# Patient Record
Sex: FEMALE | Race: BLACK OR AFRICAN AMERICAN
[De-identification: names, ages, dates, MRNs, and addresses within clinical notes are randomized per-mention and may not be internally consistent; named-entity substitution may affect disease eponyms.]

---

## 2019-08-10 ENCOUNTER — HOSPITAL ENCOUNTER (OUTPATIENT)
Dept: HOSPITAL 92 - ERS | Age: 29
Setting detail: OBSERVATION
LOS: 1 days | Discharge: HOME | End: 2019-08-11
Attending: INTERNAL MEDICINE | Admitting: INTERNAL MEDICINE
Payer: SELF-PAY

## 2019-08-10 VITALS — BODY MASS INDEX: 43.8 KG/M2

## 2019-08-10 DIAGNOSIS — Z91.012: ICD-10-CM

## 2019-08-10 DIAGNOSIS — K85.90: Primary | ICD-10-CM

## 2019-08-10 LAB
ALBUMIN SERPL BCG-MCNC: 3.7 G/DL (ref 3.5–5)
ALP SERPL-CCNC: 45 U/L (ref 40–150)
ALT SERPL W P-5'-P-CCNC: 12 U/L (ref 8–55)
ANION GAP SERPL CALC-SCNC: 10 MMOL/L (ref 10–20)
AST SERPL-CCNC: 13 U/L (ref 5–34)
BASOPHILS # BLD AUTO: 0.1 THOU/UL (ref 0–0.2)
BASOPHILS NFR BLD AUTO: 1.1 % (ref 0–1)
BILIRUB SERPL-MCNC: 0.2 MG/DL (ref 0.2–1.2)
BUN SERPL-MCNC: 14 MG/DL (ref 7–18.7)
CALCIUM SERPL-MCNC: 8.8 MG/DL (ref 7.8–10.44)
CHLORIDE SERPL-SCNC: 109 MMOL/L (ref 98–107)
CO2 SERPL-SCNC: 23 MMOL/L (ref 22–29)
CREAT CL PREDICTED SERPL C-G-VRATE: 0 ML/MIN (ref 70–130)
EOSINOPHIL # BLD AUTO: 0.4 THOU/UL (ref 0–0.7)
EOSINOPHIL NFR BLD AUTO: 7.4 % (ref 0–10)
GLOBULIN SER CALC-MCNC: 2.8 G/DL (ref 2.4–3.5)
GLUCOSE SERPL-MCNC: 92 MG/DL (ref 70–105)
HGB BLD-MCNC: 11.4 G/DL (ref 12–16)
LIPASE SERPL-CCNC: 221 U/L (ref 8–78)
LYMPHOCYTES # BLD: 1.9 THOU/UL (ref 1.2–3.4)
LYMPHOCYTES NFR BLD AUTO: 39.8 % (ref 21–51)
MCH RBC QN AUTO: 28 PG (ref 27–31)
MCV RBC AUTO: 85 FL (ref 78–98)
MONOCYTES # BLD AUTO: 0.5 THOU/UL (ref 0.11–0.59)
MONOCYTES NFR BLD AUTO: 10.5 % (ref 0–10)
NEUTROPHILS # BLD AUTO: 2 THOU/UL (ref 1.4–6.5)
NEUTROPHILS NFR BLD AUTO: 41.2 % (ref 42–75)
PLATELET # BLD AUTO: 272 THOU/UL (ref 130–400)
POTASSIUM SERPL-SCNC: 3.8 MMOL/L (ref 3.5–5.1)
PREGS CONTROL BACKGROUND?: (no result)
PREGS CONTROL BAR APPEAR?: YES
RBC # BLD AUTO: 4.08 MILL/UL (ref 4.2–5.4)
SODIUM SERPL-SCNC: 138 MMOL/L (ref 136–145)
WBC # BLD AUTO: 4.8 THOU/UL (ref 4.8–10.8)

## 2019-08-10 PROCEDURE — 96361 HYDRATE IV INFUSION ADD-ON: CPT

## 2019-08-10 PROCEDURE — 80048 BASIC METABOLIC PNL TOTAL CA: CPT

## 2019-08-10 PROCEDURE — 84703 CHORIONIC GONADOTROPIN ASSAY: CPT

## 2019-08-10 PROCEDURE — 83690 ASSAY OF LIPASE: CPT

## 2019-08-10 PROCEDURE — 96375 TX/PRO/DX INJ NEW DRUG ADDON: CPT

## 2019-08-10 PROCEDURE — G0378 HOSPITAL OBSERVATION PER HR: HCPCS

## 2019-08-10 PROCEDURE — 36415 COLL VENOUS BLD VENIPUNCTURE: CPT

## 2019-08-10 PROCEDURE — 80076 HEPATIC FUNCTION PANEL: CPT

## 2019-08-10 PROCEDURE — 96374 THER/PROPH/DIAG INJ IV PUSH: CPT

## 2019-08-10 PROCEDURE — 80053 COMPREHEN METABOLIC PANEL: CPT

## 2019-08-10 PROCEDURE — 81003 URINALYSIS AUTO W/O SCOPE: CPT

## 2019-08-10 PROCEDURE — 74177 CT ABD & PELVIS W/CONTRAST: CPT

## 2019-08-10 PROCEDURE — 85025 COMPLETE CBC W/AUTO DIFF WBC: CPT

## 2019-08-10 PROCEDURE — 96372 THER/PROPH/DIAG INJ SC/IM: CPT

## 2019-08-10 NOTE — HP
PRIMARY CARE DOCTOR:  Not reported.



CODE STATUS:  Full code.



TIME OF EVALUATION:  7:40 p.m.



CHIEF COMPLAINT:  Abdominal pain.



HISTORY OF PRESENT ILLNESS:  This is a 29 years old female patient, with no

significant past medical history, came to the hospital after having severe, on and

off bilaterally worsening abdominal pain that is diffuse, with no specific

radiation, but worsen in the epigastric area; when on, it is 10/10. 



REVIEW OF SYSTEMS:  CONSTITUTIONAL:  No fever, chills, or generalized weakness. 

RESPIRATORY:  No cough, sputum production, or shortness of breath. 

CARDIOVASCULAR:  No chest pain or palpitation. 

GASTROINTESTINAL:  No nausea, no vomiting, diarrhea.  The patient has severe

abdominal pain as described in HPI. 

CNS:  No dizziness, headache, or feeling lightheaded. 

GENITOURINARY:  No burning on urination. 

EXTREMITIES:  No leg swelling. 



All other systems were reviewed and negative except for the findings mentioned above.



PAST MEDICAL HISTORY:  As mentioned in the HPI.



PAST SURGICAL HISTORY:  The patient has no surgical history.



PSYCH HISTORY:  No psych history.



SOCIAL HISTORY:  The patient drinks alcohol socially, not everyday.  No drugs.  No

smoking history.  Lives at home. 



KNOWN ALLERGIES:  To egg.  No known drug allergies.



REPORTED MEDICATIONS:  None.



PHYSICAL EXAMINATION:

VITAL SIGNS:  On presentation, blood pressure 125/85 with heart rate 68, respiratory

rate was 18, temperature 98.4.  Pain was 7/10.  Oxygen saturation was 99% on room

air. 

GENERAL APPEARANCE:  The patient is alert, oriented, in no acute distress. 

HEENT:  Eyes, normal conjunctivae.  Moist oral mucosa.  Anicteric.  No JVD. 

RESPIRATORY:  Bilateral air entry.  No rales.  No wheezes.  Symmetric expansion. 

CARDIOVASCULAR:  Normal rate, regular rhythm.  No murmurs.  No gallop.  No edema. 

ABDOMEN:  Soft.  The patient has normal bowel sounds.  The patient has diffuse

abdominal tenderness. 

MUSCULOSKELETAL:  Baseline range of motion and strength. 

SKIN:  Warm, intact.  No pallor.  No rash.  No redness.  Capillary refill seems to

be intact. 

NEURO:  No evidence of any new focal weakness.  Cranial nerves seems to be intact. 

PSYCH:  The patient is in good mood.  No anxiety.  Optimal judgment.



DIAGNOSTIC STUDIES:  Abdomen and pelvis CT showed no evidence of acute

intra-abdominal pelvic abnormalities, possible small uterine fibroid. 



LABORATORY DATA:  White count 4.8, hemoglobin 9.4, MCV 85.  Chemistry; sodium 138,

potassium 3.8, chloride 109, carbon dioxide 23, anion gap 10, BUN 14, creatinine

0.77, GFR greater than 90, glucose 92, calcium 8.8, total bilirubin is 0.2.  LFTs

were negative.  Lipase 221.  Pregnancy test was negative.  UA was negative. 



ASSESSMENT AND PLAN:  The patient will be placed in the hospital with following

medical problems; 

1. Acute pancreatitis.  The patient will receive IV fluids and pain medications.

Clear liquid diet.  We will monitor, likely will be able to go home in the morning. 

2. Severe abdominal pain, we will treat accordingly.

3. Deep venous thrombosis prophylaxis.







Job ID:  [id number]

## 2019-08-10 NOTE — CT
CT ABDOMEN AND PELVIS WITH CONTRAST:

 

HISTORY:

Abdominal pain and elevated lipase.

 

COMPARISON:

None.

 

TECHNIQUE:

Multiple contiguous axial images were obtained in a CT of the abdomen and pelvis with contrast.  Tano
nal reformats were performed.

 

FINDINGS:

The liver, gallbladder, kidneys, adrenal glands, spleen, and pancreas are unremarkable.  No free air,
 free fluid, or stranding changes are seen in the abdomen or pelvis.  In particular, no free fluid or
 stranding changes surrounding the pancreas.

 

There is a small amount of high density within the uterus, extending toward the vagina, which may rep
resent a small amount of blood products within the endometrial canal.  There is an exophytic lesion, 
measuring 1.6 cm in size, emanating from the uterus, which may represent a small exophytic uterine fi
broid.  The reproductive organs are otherwise unremarkable.  The large and small bowel are unremarkab
le.  The appendix is unremarkable.  No abdominal or pelvic lymphadenopathy is seen.

 

The osseous structures, the visualized inferior thorax, and the abdominal wall soft tissues are unrem
arkable.

 

IMPRESSION:

1.  No evidence of acute intraabdominal/pelvic abnormality.

 

2.  Possible small uterine fibroid.

 

POS: [facility]

## 2019-08-11 VITALS — SYSTOLIC BLOOD PRESSURE: 152 MMHG | DIASTOLIC BLOOD PRESSURE: 88 MMHG | TEMPERATURE: 97.9 F

## 2019-08-11 LAB
ALBUMIN SERPL BCG-MCNC: 3.1 G/DL (ref 3.5–5)
ALP SERPL-CCNC: 40 U/L (ref 40–150)
ALT SERPL W P-5'-P-CCNC: 9 U/L (ref 8–55)
ANION GAP SERPL CALC-SCNC: 10 MMOL/L (ref 10–20)
AST SERPL-CCNC: 14 U/L (ref 5–34)
BASOPHILS # BLD AUTO: 0 THOU/UL (ref 0–0.2)
BASOPHILS NFR BLD AUTO: 0.7 % (ref 0–1)
BILIRUB DIRECT SERPL-MCNC: 0.2 MG/DL (ref 0.1–0.3)
BILIRUB SERPL-MCNC: 0.3 MG/DL (ref 0.2–1.2)
BUN SERPL-MCNC: 11 MG/DL (ref 7–18.7)
CALCIUM SERPL-MCNC: 7.9 MG/DL (ref 7.8–10.44)
CHLORIDE SERPL-SCNC: 111 MMOL/L (ref 98–107)
CO2 SERPL-SCNC: 21 MMOL/L (ref 22–29)
CREAT CL PREDICTED SERPL C-G-VRATE: 196 ML/MIN (ref 70–130)
EOSINOPHIL # BLD AUTO: 0.4 THOU/UL (ref 0–0.7)
EOSINOPHIL NFR BLD AUTO: 6.5 % (ref 0–10)
GLUCOSE SERPL-MCNC: 86 MG/DL (ref 70–105)
HGB BLD-MCNC: 10.3 G/DL (ref 12–16)
LIPASE SERPL-CCNC: 38 U/L (ref 8–78)
LYMPHOCYTES # BLD: 2.7 THOU/UL (ref 1.2–3.4)
LYMPHOCYTES NFR BLD AUTO: 47.2 % (ref 21–51)
MCH RBC QN AUTO: 28 PG (ref 27–31)
MCV RBC AUTO: 86.5 FL (ref 78–98)
MONOCYTES # BLD AUTO: 0.4 THOU/UL (ref 0.11–0.59)
MONOCYTES NFR BLD AUTO: 7.5 % (ref 0–10)
NEUTROPHILS # BLD AUTO: 2.2 THOU/UL (ref 1.4–6.5)
NEUTROPHILS NFR BLD AUTO: 38.2 % (ref 42–75)
PLATELET # BLD AUTO: 234 THOU/UL (ref 130–400)
POTASSIUM SERPL-SCNC: 3.5 MMOL/L (ref 3.5–5.1)
RBC # BLD AUTO: 3.68 MILL/UL (ref 4.2–5.4)
SODIUM SERPL-SCNC: 138 MMOL/L (ref 136–145)
WBC # BLD AUTO: 5.7 THOU/UL (ref 4.8–10.8)

## 2019-09-03 ENCOUNTER — HOSPITAL ENCOUNTER (EMERGENCY)
Dept: HOSPITAL 92 - ERS | Age: 29
Discharge: HOME | End: 2019-09-03
Payer: SELF-PAY

## 2019-09-03 DIAGNOSIS — R11.0: ICD-10-CM

## 2019-09-03 DIAGNOSIS — F17.210: ICD-10-CM

## 2019-09-03 DIAGNOSIS — R10.9: Primary | ICD-10-CM

## 2019-09-03 LAB
ALBUMIN SERPL BCG-MCNC: 3.6 G/DL (ref 3.5–5)
ALP SERPL-CCNC: 47 U/L (ref 40–150)
ALT SERPL W P-5'-P-CCNC: 14 U/L (ref 8–55)
ANION GAP SERPL CALC-SCNC: 10 MMOL/L (ref 10–20)
AST SERPL-CCNC: 15 U/L (ref 5–34)
BASOPHILS # BLD AUTO: 0 THOU/UL (ref 0–0.2)
BASOPHILS NFR BLD AUTO: 0.3 % (ref 0–1)
BILIRUB SERPL-MCNC: 0.3 MG/DL (ref 0.2–1.2)
BUN SERPL-MCNC: 13 MG/DL (ref 7–18.7)
CALCIUM SERPL-MCNC: 8.4 MG/DL (ref 7.8–10.44)
CHLORIDE SERPL-SCNC: 108 MMOL/L (ref 98–107)
CO2 SERPL-SCNC: 23 MMOL/L (ref 22–29)
CREAT CL PREDICTED SERPL C-G-VRATE: 0 ML/MIN (ref 70–130)
EOSINOPHIL # BLD AUTO: 0.4 THOU/UL (ref 0–0.7)
EOSINOPHIL NFR BLD AUTO: 7.4 % (ref 0–10)
GLOBULIN SER CALC-MCNC: 2.5 G/DL (ref 2.4–3.5)
GLUCOSE SERPL-MCNC: 90 MG/DL (ref 70–105)
HGB BLD-MCNC: 11.1 G/DL (ref 12–16)
LIPASE SERPL-CCNC: 63 U/L (ref 8–78)
LYMPHOCYTES # BLD: 1.3 THOU/UL (ref 1.2–3.4)
LYMPHOCYTES NFR BLD AUTO: 22.9 % (ref 21–51)
MCH RBC QN AUTO: 28.3 PG (ref 27–31)
MCV RBC AUTO: 84.9 FL (ref 78–98)
MONOCYTES # BLD AUTO: 0.6 THOU/UL (ref 0.11–0.59)
MONOCYTES NFR BLD AUTO: 11 % (ref 0–10)
NEUTROPHILS # BLD AUTO: 3.3 THOU/UL (ref 1.4–6.5)
NEUTROPHILS NFR BLD AUTO: 58.4 % (ref 42–75)
PLATELET # BLD AUTO: 299 THOU/UL (ref 130–400)
POTASSIUM SERPL-SCNC: 3.7 MMOL/L (ref 3.5–5.1)
PREGU CONTROL BACKGROUND?: (no result)
PREGU CONTROL BAR APPEAR?: YES
PROT UR STRIP.AUTO-MCNC: 30 MG/DL
RBC # BLD AUTO: 3.92 MILL/UL (ref 4.2–5.4)
SODIUM SERPL-SCNC: 137 MMOL/L (ref 136–145)
WBC # BLD AUTO: 5.7 THOU/UL (ref 4.8–10.8)
WBC UR QL AUTO: (no result) HPF (ref 0–3)

## 2019-09-03 PROCEDURE — 36415 COLL VENOUS BLD VENIPUNCTURE: CPT

## 2019-09-03 PROCEDURE — 81015 MICROSCOPIC EXAM OF URINE: CPT

## 2019-09-03 PROCEDURE — 85025 COMPLETE CBC W/AUTO DIFF WBC: CPT

## 2019-09-03 PROCEDURE — 81025 URINE PREGNANCY TEST: CPT

## 2019-09-03 PROCEDURE — 81003 URINALYSIS AUTO W/O SCOPE: CPT

## 2019-09-03 PROCEDURE — 80053 COMPREHEN METABOLIC PANEL: CPT

## 2019-09-03 PROCEDURE — 99284 EMERGENCY DEPT VISIT MOD MDM: CPT

## 2019-09-03 PROCEDURE — 96372 THER/PROPH/DIAG INJ SC/IM: CPT

## 2019-09-03 PROCEDURE — 83690 ASSAY OF LIPASE: CPT

## 2019-10-04 ENCOUNTER — HOSPITAL ENCOUNTER (EMERGENCY)
Dept: HOSPITAL 92 - ERS | Age: 29
Discharge: HOME | End: 2019-10-04
Payer: SELF-PAY

## 2019-10-04 DIAGNOSIS — K59.00: Primary | ICD-10-CM

## 2019-10-04 DIAGNOSIS — F17.210: ICD-10-CM

## 2019-10-04 LAB
ALBUMIN SERPL BCG-MCNC: 3.7 G/DL (ref 3.5–5)
ALP SERPL-CCNC: 44 U/L (ref 40–110)
ALT SERPL W P-5'-P-CCNC: 11 U/L (ref 8–55)
ANION GAP SERPL CALC-SCNC: 10 MMOL/L (ref 10–20)
AST SERPL-CCNC: 12 U/L (ref 5–34)
BASOPHILS # BLD AUTO: 0 THOU/UL (ref 0–0.2)
BASOPHILS NFR BLD AUTO: 0.5 % (ref 0–1)
BILIRUB SERPL-MCNC: 0.2 MG/DL (ref 0.2–1.2)
BUN SERPL-MCNC: 15 MG/DL (ref 7–18.7)
CALCIUM SERPL-MCNC: 8.4 MG/DL (ref 7.8–10.44)
CHLORIDE SERPL-SCNC: 107 MMOL/L (ref 98–107)
CO2 SERPL-SCNC: 24 MMOL/L (ref 22–29)
CREAT CL PREDICTED SERPL C-G-VRATE: 0 ML/MIN (ref 70–130)
EOSINOPHIL # BLD AUTO: 0.2 THOU/UL (ref 0–0.7)
EOSINOPHIL NFR BLD AUTO: 3.4 % (ref 0–10)
GLOBULIN SER CALC-MCNC: 2.8 G/DL (ref 2.4–3.5)
GLUCOSE SERPL-MCNC: 101 MG/DL (ref 70–105)
HGB BLD-MCNC: 11.8 G/DL (ref 12–16)
LIPASE SERPL-CCNC: 18 U/L (ref 8–78)
LYMPHOCYTES # BLD: 1.7 THOU/UL (ref 1.2–3.4)
LYMPHOCYTES NFR BLD AUTO: 27.4 % (ref 21–51)
MCH RBC QN AUTO: 27.9 PG (ref 27–31)
MCV RBC AUTO: 85.8 FL (ref 78–98)
MONOCYTES # BLD AUTO: 0.6 THOU/UL (ref 0.11–0.59)
MONOCYTES NFR BLD AUTO: 9.2 % (ref 0–10)
NEUTROPHILS # BLD AUTO: 3.6 THOU/UL (ref 1.4–6.5)
NEUTROPHILS NFR BLD AUTO: 59.6 % (ref 42–75)
PLATELET # BLD AUTO: 295 THOU/UL (ref 130–400)
POTASSIUM SERPL-SCNC: 3.5 MMOL/L (ref 3.5–5.1)
PREGS CONTROL BACKGROUND?: (no result)
PREGS CONTROL BAR APPEAR?: YES
RBC # BLD AUTO: 4.22 MILL/UL (ref 4.2–5.4)
SODIUM SERPL-SCNC: 137 MMOL/L (ref 136–145)
WBC # BLD AUTO: 6 THOU/UL (ref 4.8–10.8)

## 2019-10-04 PROCEDURE — 85025 COMPLETE CBC W/AUTO DIFF WBC: CPT

## 2019-10-04 PROCEDURE — 83690 ASSAY OF LIPASE: CPT

## 2019-10-04 PROCEDURE — 74022 RADEX COMPL AQT ABD SERIES: CPT

## 2019-10-04 PROCEDURE — 84703 CHORIONIC GONADOTROPIN ASSAY: CPT

## 2019-10-04 PROCEDURE — 36415 COLL VENOUS BLD VENIPUNCTURE: CPT

## 2019-10-04 PROCEDURE — 80053 COMPREHEN METABOLIC PANEL: CPT

## 2019-10-04 NOTE — RAD
2 VIEWS OF ABDOMEN AND UPRIGHT VIEW OF CHEST:

 

Date:  10/04/19 

 

HISTORY:  

Abdominal pain that began 1 day ago. 

 

FINDINGS:

Supine and upright views of the abdomen and upright view of the chest shows nonspecific, nonobstructi
ve bowel gas pattern. No free air or air fluid levels are seen on upright examination. 

 

The heart is normal in size. There is no evidence of consolidation, mass, or pleural effusion. 

 

IMPRESSION: 

Nonobstructive bowel gas pattern. 

 

 

POS: [facility]

## 2020-01-03 ENCOUNTER — HOSPITAL ENCOUNTER (EMERGENCY)
Dept: HOSPITAL 92 - ERS | Age: 30
Discharge: HOME | End: 2020-01-03
Payer: SELF-PAY

## 2020-01-03 DIAGNOSIS — F17.210: ICD-10-CM

## 2020-01-03 DIAGNOSIS — R10.30: Primary | ICD-10-CM

## 2020-01-03 LAB
BASOPHILS # BLD AUTO: 0 THOU/UL (ref 0–0.2)
BASOPHILS NFR BLD AUTO: 0.4 % (ref 0–1)
EOSINOPHIL # BLD AUTO: 0.3 THOU/UL (ref 0–0.7)
EOSINOPHIL NFR BLD AUTO: 6.8 % (ref 0–10)
HGB BLD-MCNC: 11.7 G/DL (ref 12–16)
LEUKOCYTE ESTERASE UR QL STRIP.AUTO: 250 LEU/UL
LYMPHOCYTES # BLD: 1.8 THOU/UL (ref 1.2–3.4)
LYMPHOCYTES NFR BLD AUTO: 37.9 % (ref 21–51)
MCH RBC QN AUTO: 26.8 PG (ref 27–31)
MCV RBC AUTO: 81.9 FL (ref 78–98)
MONOCYTES # BLD AUTO: 0.4 THOU/UL (ref 0.11–0.59)
MONOCYTES NFR BLD AUTO: 9.3 % (ref 0–10)
NEUTROPHILS # BLD AUTO: 2.1 THOU/UL (ref 1.4–6.5)
NEUTROPHILS NFR BLD AUTO: 45.7 % (ref 42–75)
PLATELET # BLD AUTO: 335 THOU/UL (ref 130–400)
PREGS CONTROL BACKGROUND?: (no result)
PREGS CONTROL BAR APPEAR?: YES
PROT UR STRIP.AUTO-MCNC: 20 MG/DL
RBC # BLD AUTO: 4.35 MILL/UL (ref 4.2–5.4)
WBC # BLD AUTO: 4.6 THOU/UL (ref 4.8–10.8)

## 2020-01-03 PROCEDURE — 81015 MICROSCOPIC EXAM OF URINE: CPT

## 2020-01-03 PROCEDURE — 96372 THER/PROPH/DIAG INJ SC/IM: CPT

## 2020-01-03 PROCEDURE — 84703 CHORIONIC GONADOTROPIN ASSAY: CPT

## 2020-01-03 PROCEDURE — 99284 EMERGENCY DEPT VISIT MOD MDM: CPT

## 2020-01-03 PROCEDURE — 36415 COLL VENOUS BLD VENIPUNCTURE: CPT

## 2020-01-03 PROCEDURE — 81003 URINALYSIS AUTO W/O SCOPE: CPT

## 2020-01-03 PROCEDURE — 85025 COMPLETE CBC W/AUTO DIFF WBC: CPT

## 2020-02-03 ENCOUNTER — HOSPITAL ENCOUNTER (EMERGENCY)
Dept: HOSPITAL 92 - ERS | Age: 30
Discharge: HOME | End: 2020-02-03
Payer: SELF-PAY

## 2020-02-03 DIAGNOSIS — N94.6: Primary | ICD-10-CM

## 2020-02-03 DIAGNOSIS — F17.210: ICD-10-CM

## 2020-02-03 DIAGNOSIS — R11.2: ICD-10-CM

## 2020-02-03 LAB
ALBUMIN SERPL BCG-MCNC: 3.6 G/DL (ref 3.5–5)
ALP SERPL-CCNC: 44 U/L (ref 40–110)
ALT SERPL W P-5'-P-CCNC: 10 U/L (ref 8–55)
ANION GAP SERPL CALC-SCNC: 10 MMOL/L (ref 10–20)
AST SERPL-CCNC: 12 U/L (ref 5–34)
BASOPHILS # BLD AUTO: 0.1 THOU/UL (ref 0–0.2)
BASOPHILS NFR BLD AUTO: 1.1 % (ref 0–1)
BILIRUB SERPL-MCNC: 0.2 MG/DL (ref 0.2–1.2)
BUN SERPL-MCNC: 12 MG/DL (ref 7–18.7)
CALCIUM SERPL-MCNC: 8.5 MG/DL (ref 7.8–10.44)
CHLORIDE SERPL-SCNC: 106 MMOL/L (ref 98–107)
CO2 SERPL-SCNC: 24 MMOL/L (ref 22–29)
CREAT CL PREDICTED SERPL C-G-VRATE: 0 ML/MIN (ref 70–130)
EOSINOPHIL # BLD AUTO: 0.2 THOU/UL (ref 0–0.7)
EOSINOPHIL NFR BLD AUTO: 3.5 % (ref 0–10)
GLOBULIN SER CALC-MCNC: 2.7 G/DL (ref 2.4–3.5)
GLUCOSE SERPL-MCNC: 117 MG/DL (ref 70–105)
HGB BLD-MCNC: 13.3 G/DL (ref 12–16)
LYMPHOCYTES # BLD: 1.8 THOU/UL (ref 1.2–3.4)
LYMPHOCYTES NFR BLD AUTO: 29.1 % (ref 21–51)
MCH RBC QN AUTO: 26.6 PG (ref 27–31)
MCV RBC AUTO: 83.6 FL (ref 78–98)
MONOCYTES # BLD AUTO: 0.6 THOU/UL (ref 0.11–0.59)
MONOCYTES NFR BLD AUTO: 9.4 % (ref 0–10)
NEUTROPHILS # BLD AUTO: 3.6 THOU/UL (ref 1.4–6.5)
NEUTROPHILS NFR BLD AUTO: 56.9 % (ref 42–75)
PLATELET # BLD AUTO: 287 THOU/UL (ref 130–400)
POTASSIUM SERPL-SCNC: 3.5 MMOL/L (ref 3.5–5.1)
PREGS CONTROL BACKGROUND?: (no result)
PREGS CONTROL BAR APPEAR?: YES
PROT UR STRIP.AUTO-MCNC: 70 MG/DL
RBC # BLD AUTO: 5.02 MILL/UL (ref 4.2–5.4)
RBC UR QL AUTO: (no result) HPF (ref 0–3)
SODIUM SERPL-SCNC: 136 MMOL/L (ref 136–145)
WBC # BLD AUTO: 6.3 THOU/UL (ref 4.8–10.8)
WBC UR QL AUTO: (no result) HPF (ref 0–3)

## 2020-02-03 PROCEDURE — 93976 VASCULAR STUDY: CPT

## 2020-02-03 PROCEDURE — 81003 URINALYSIS AUTO W/O SCOPE: CPT

## 2020-02-03 PROCEDURE — 96361 HYDRATE IV INFUSION ADD-ON: CPT

## 2020-02-03 PROCEDURE — 81015 MICROSCOPIC EXAM OF URINE: CPT

## 2020-02-03 PROCEDURE — 76856 US EXAM PELVIC COMPLETE: CPT

## 2020-02-03 PROCEDURE — 84703 CHORIONIC GONADOTROPIN ASSAY: CPT

## 2020-02-03 PROCEDURE — 93005 ELECTROCARDIOGRAM TRACING: CPT

## 2020-02-03 PROCEDURE — 36415 COLL VENOUS BLD VENIPUNCTURE: CPT

## 2020-02-03 PROCEDURE — 85025 COMPLETE CBC W/AUTO DIFF WBC: CPT

## 2020-02-03 PROCEDURE — 96375 TX/PRO/DX INJ NEW DRUG ADDON: CPT

## 2020-02-03 PROCEDURE — 96374 THER/PROPH/DIAG INJ IV PUSH: CPT

## 2020-02-03 PROCEDURE — 80053 COMPREHEN METABOLIC PANEL: CPT

## 2020-02-03 NOTE — ULT
TRANSABDOMINAL PELVIC ULTRASOUND:

 

INDICATION: 

Pelvic pain.

 

TECHNIQUE: 

Gray scale, color Doppler, and spectral Doppler images were obtained of the pelvis via transabdominal
 approach.  The patient declined a transvaginal examination. 

 

FINDINGS: 

There is limited detail of the uterus due to overlying bowel gas and poor acoustic window of the lowe
r pelvis through the bladder.  The bladder is not fully distended.  The uterus measures approximately
 4.2 x 4.9 x 7.2 cm.  The endometrial stripe is not well seen.  The right ovary measures 2.2 x 1.2 x 
2.1 cm.  The left ovary measures 1.3 x 2.6 x 1.9 cm.  There is normal flow to the right and left ovar
y.  No free fluid is evident.

 

IMPRESSION: 

1.  Technically limited examination.

 

2.  No definite acute sonographic abnormality evident within limitation of the exam.

 

3.  No free fluid.

 

POS: [facility]

## 2021-03-18 ENCOUNTER — HOSPITAL ENCOUNTER (EMERGENCY)
Dept: HOSPITAL 92 - ERS | Age: 31
Discharge: HOME | End: 2021-03-18
Payer: SELF-PAY

## 2021-03-18 DIAGNOSIS — F17.210: ICD-10-CM

## 2021-03-18 DIAGNOSIS — N93.9: Primary | ICD-10-CM

## 2021-03-18 DIAGNOSIS — D64.9: ICD-10-CM

## 2021-03-18 DIAGNOSIS — M72.2: ICD-10-CM

## 2021-03-18 LAB
BASOPHILS # BLD AUTO: 0.1 THOU/UL (ref 0–0.2)
BASOPHILS NFR BLD AUTO: 0.8 % (ref 0–1)
EOSINOPHIL # BLD AUTO: 0.2 THOU/UL (ref 0–0.7)
EOSINOPHIL NFR BLD AUTO: 3.1 % (ref 0–10)
HGB BLD-MCNC: 10.9 G/DL (ref 12–16)
LYMPHOCYTES # BLD: 2.7 THOU/UL (ref 1.2–3.4)
LYMPHOCYTES NFR BLD AUTO: 35.9 % (ref 21–51)
MCH RBC QN AUTO: 27.4 PG (ref 27–31)
MCV RBC AUTO: 82.8 FL (ref 78–98)
MONOCYTES # BLD AUTO: 0.5 THOU/UL (ref 0.11–0.59)
MONOCYTES NFR BLD AUTO: 7 % (ref 0–10)
NEUTROPHILS # BLD AUTO: 4 THOU/UL (ref 1.4–6.5)
NEUTROPHILS NFR BLD AUTO: 53.2 % (ref 42–75)
PLATELET # BLD AUTO: 306 THOU/UL (ref 130–400)
PREGS CONTROL BACKGROUND?: (no result)
PREGS CONTROL BAR APPEAR?: YES
RBC # BLD AUTO: 3.97 MILL/UL (ref 4.2–5.4)
WBC # BLD AUTO: 7.5 THOU/UL (ref 4.8–10.8)

## 2021-03-18 PROCEDURE — 36415 COLL VENOUS BLD VENIPUNCTURE: CPT

## 2021-03-18 PROCEDURE — 84703 CHORIONIC GONADOTROPIN ASSAY: CPT

## 2021-03-18 PROCEDURE — 85025 COMPLETE CBC W/AUTO DIFF WBC: CPT

## 2021-03-18 PROCEDURE — 99281 EMR DPT VST MAYX REQ PHY/QHP: CPT

## 2021-04-21 ENCOUNTER — HOSPITAL ENCOUNTER (EMERGENCY)
Dept: HOSPITAL 92 - ERS | Age: 31
Discharge: HOME | End: 2021-04-21
Payer: SELF-PAY

## 2021-04-21 DIAGNOSIS — R10.30: Primary | ICD-10-CM

## 2021-04-21 DIAGNOSIS — R42: ICD-10-CM

## 2021-04-21 DIAGNOSIS — N93.9: ICD-10-CM

## 2021-04-21 DIAGNOSIS — R11.2: ICD-10-CM

## 2021-04-21 LAB
ALBUMIN SERPL BCG-MCNC: 3.6 G/DL (ref 3.5–5)
ALP SERPL-CCNC: 53 U/L (ref 40–110)
ALT SERPL W P-5'-P-CCNC: 11 U/L (ref 8–55)
ANION GAP SERPL CALC-SCNC: 13 MMOL/L (ref 10–20)
AST SERPL-CCNC: 12 U/L (ref 5–34)
BACTERIA UR QL AUTO: (no result) HPF
BASOPHILS # BLD AUTO: 0 THOU/UL (ref 0–0.2)
BASOPHILS NFR BLD AUTO: 0.3 % (ref 0–1)
BILIRUB SERPL-MCNC: 0.2 MG/DL (ref 0.2–1.2)
BUN SERPL-MCNC: 13 MG/DL (ref 7–18.7)
CALCIUM SERPL-MCNC: 8.9 MG/DL (ref 7.8–10.44)
CHLORIDE SERPL-SCNC: 108 MMOL/L (ref 98–107)
CO2 SERPL-SCNC: 24 MMOL/L (ref 22–29)
CREAT CL PREDICTED SERPL C-G-VRATE: 0 ML/MIN (ref 70–130)
EOSINOPHIL # BLD AUTO: 0.1 THOU/UL (ref 0–0.7)
EOSINOPHIL NFR BLD AUTO: 0.9 % (ref 0–10)
GLOBULIN SER CALC-MCNC: 3 G/DL (ref 2.4–3.5)
GLUCOSE SERPL-MCNC: 100 MG/DL (ref 70–105)
HGB BLD-MCNC: 11.3 G/DL (ref 12–16)
LIPASE SERPL-CCNC: 15 U/L (ref 8–78)
LYMPHOCYTES # BLD: 1 THOU/UL (ref 1.2–3.4)
LYMPHOCYTES NFR BLD AUTO: 15 % (ref 21–51)
MCH RBC QN AUTO: 27.1 PG (ref 27–31)
MCV RBC AUTO: 83.9 FL (ref 78–98)
MONOCYTES # BLD AUTO: 0.6 THOU/UL (ref 0.11–0.59)
MONOCYTES NFR BLD AUTO: 8.7 % (ref 0–10)
NEUTROPHILS # BLD AUTO: 4.9 THOU/UL (ref 1.4–6.5)
NEUTROPHILS NFR BLD AUTO: 75.1 % (ref 42–75)
PLATELET # BLD AUTO: 302 THOU/UL (ref 130–400)
POTASSIUM SERPL-SCNC: 3.8 MMOL/L (ref 3.5–5.1)
PREGU CONTROL BACKGROUND?: (no result)
PREGU CONTROL BAR APPEAR?: YES
RBC # BLD AUTO: 4.18 MILL/UL (ref 4.2–5.4)
SODIUM SERPL-SCNC: 141 MMOL/L (ref 136–145)
SP GR UR STRIP: 1.02 (ref 1–1.04)
WBC # BLD AUTO: 6.6 THOU/UL (ref 4.8–10.8)
WBC UR QL AUTO: (no result) HPF (ref 0–3)

## 2021-04-21 PROCEDURE — 81025 URINE PREGNANCY TEST: CPT

## 2021-04-21 PROCEDURE — 80053 COMPREHEN METABOLIC PANEL: CPT

## 2021-04-21 PROCEDURE — 96372 THER/PROPH/DIAG INJ SC/IM: CPT

## 2021-04-21 PROCEDURE — 83690 ASSAY OF LIPASE: CPT

## 2021-04-21 PROCEDURE — 85025 COMPLETE CBC W/AUTO DIFF WBC: CPT

## 2021-04-21 PROCEDURE — 81001 URINALYSIS AUTO W/SCOPE: CPT

## 2021-04-21 PROCEDURE — 99284 EMERGENCY DEPT VISIT MOD MDM: CPT

## 2021-04-21 PROCEDURE — 87086 URINE CULTURE/COLONY COUNT: CPT

## 2021-05-04 ENCOUNTER — HOSPITAL ENCOUNTER (EMERGENCY)
Dept: HOSPITAL 92 - ERS | Age: 31
Discharge: HOME | End: 2021-05-04
Payer: SELF-PAY

## 2021-05-04 DIAGNOSIS — M72.2: Primary | ICD-10-CM

## 2021-05-04 PROCEDURE — 99281 EMR DPT VST MAYX REQ PHY/QHP: CPT

## 2021-05-22 ENCOUNTER — HOSPITAL ENCOUNTER (EMERGENCY)
Dept: HOSPITAL 92 - CSHERS | Age: 31
Discharge: HOME | End: 2021-05-22
Payer: SELF-PAY

## 2021-05-22 DIAGNOSIS — R10.30: Primary | ICD-10-CM

## 2021-05-22 LAB
PREGS CONTROL BACKGROUND?: (no result)
PREGS CONTROL BAR APPEAR?: YES

## 2021-05-22 PROCEDURE — 96372 THER/PROPH/DIAG INJ SC/IM: CPT

## 2021-05-22 PROCEDURE — 84703 CHORIONIC GONADOTROPIN ASSAY: CPT

## 2021-05-22 PROCEDURE — 36415 COLL VENOUS BLD VENIPUNCTURE: CPT

## 2021-05-22 PROCEDURE — 99284 EMERGENCY DEPT VISIT MOD MDM: CPT

## 2021-05-31 ENCOUNTER — HOSPITAL ENCOUNTER (EMERGENCY)
Dept: HOSPITAL 92 - ERS | Age: 31
Discharge: HOME | End: 2021-05-31
Payer: SELF-PAY

## 2021-05-31 DIAGNOSIS — J02.9: Primary | ICD-10-CM

## 2021-05-31 DIAGNOSIS — J34.89: ICD-10-CM

## 2021-05-31 PROCEDURE — 99283 EMERGENCY DEPT VISIT LOW MDM: CPT

## 2021-05-31 PROCEDURE — 87081 CULTURE SCREEN ONLY: CPT

## 2021-05-31 PROCEDURE — 87430 STREP A AG IA: CPT

## 2021-07-05 ENCOUNTER — HOSPITAL ENCOUNTER (EMERGENCY)
Dept: HOSPITAL 18 - NAV ERS | Age: 31
LOS: 1 days | Discharge: HOME | End: 2021-07-06
Payer: COMMERCIAL

## 2021-07-05 DIAGNOSIS — S39.012A: Primary | ICD-10-CM
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
## 2021-07-05 DIAGNOSIS — R51.9: ICD-10-CM
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
## 2021-07-05 LAB
PREGU CONTROL BACKGROUND?: (no result)
PREGU CONTROL BAR APPEAR?: YES

## 2021-07-05 PROCEDURE — 72100 X-RAY EXAM L-S SPINE 2/3 VWS: CPT

## 2021-07-05 PROCEDURE — 70450 CT HEAD/BRAIN W/O DYE: CPT

## 2021-07-05 PROCEDURE — 81025 URINE PREGNANCY TEST: CPT

## 2021-12-03 ENCOUNTER — HOSPITAL ENCOUNTER (EMERGENCY)
Dept: HOSPITAL 18 - NAV ERS | Age: 31
Discharge: HOME | End: 2021-12-03
Payer: SELF-PAY

## 2021-12-03 DIAGNOSIS — R19.7: ICD-10-CM

## 2021-12-03 DIAGNOSIS — K29.70: Primary | ICD-10-CM

## 2021-12-03 PROCEDURE — 99283 EMERGENCY DEPT VISIT LOW MDM: CPT

## 2021-12-12 ENCOUNTER — HOSPITAL ENCOUNTER (EMERGENCY)
Dept: HOSPITAL 92 - ERS | Age: 31
Discharge: HOME | End: 2021-12-12
Payer: SELF-PAY

## 2021-12-12 DIAGNOSIS — J36: Primary | ICD-10-CM

## 2021-12-12 PROCEDURE — 96375 TX/PRO/DX INJ NEW DRUG ADDON: CPT

## 2021-12-12 PROCEDURE — 96365 THER/PROPH/DIAG IV INF INIT: CPT

## 2022-02-02 ENCOUNTER — HOSPITAL ENCOUNTER (EMERGENCY)
Dept: HOSPITAL 92 - ERS | Age: 32
Discharge: HOME | End: 2022-02-02
Payer: SELF-PAY

## 2022-02-02 DIAGNOSIS — N94.6: Primary | ICD-10-CM

## 2022-02-02 DIAGNOSIS — E66.01: ICD-10-CM

## 2022-02-02 DIAGNOSIS — R10.30: ICD-10-CM

## 2022-02-02 DIAGNOSIS — F17.210: ICD-10-CM

## 2022-02-02 PROCEDURE — 99283 EMERGENCY DEPT VISIT LOW MDM: CPT

## 2022-02-15 ENCOUNTER — HOSPITAL ENCOUNTER (EMERGENCY)
Dept: HOSPITAL 18 - NAV ERS | Age: 32
Discharge: HOME | End: 2022-02-15
Payer: SELF-PAY

## 2022-02-15 DIAGNOSIS — F17.210: ICD-10-CM

## 2022-02-15 DIAGNOSIS — R11.2: Primary | ICD-10-CM

## 2022-02-15 DIAGNOSIS — R10.10: ICD-10-CM

## 2022-02-15 PROCEDURE — 96372 THER/PROPH/DIAG INJ SC/IM: CPT

## 2022-02-15 PROCEDURE — 99283 EMERGENCY DEPT VISIT LOW MDM: CPT

## 2022-02-16 ENCOUNTER — HOSPITAL ENCOUNTER (EMERGENCY)
Dept: HOSPITAL 18 - NAV ERS | Age: 32
Discharge: HOME | End: 2022-02-16
Payer: SELF-PAY

## 2022-02-16 ENCOUNTER — HOSPITAL ENCOUNTER (EMERGENCY)
Dept: HOSPITAL 92 - CSHERS | Age: 32
Discharge: HOME | End: 2022-02-16
Payer: SELF-PAY

## 2022-02-16 DIAGNOSIS — A08.4: Primary | ICD-10-CM

## 2022-02-16 DIAGNOSIS — F17.210: ICD-10-CM

## 2022-02-16 LAB
ALBUMIN SERPL BCG-MCNC: 4.1 G/DL (ref 3.5–5)
ALP SERPL-CCNC: 47 U/L (ref 40–110)
ALT SERPL W P-5'-P-CCNC: 15 U/L (ref 8–55)
ANION GAP SERPL CALC-SCNC: 14 MMOL/L (ref 10–20)
ANISOCYTOSIS BLD QL SMEAR: (no result) (100X)
AST SERPL-CCNC: 15 U/L (ref 5–34)
BASOPHILS # BLD AUTO: 0 10X3/UL (ref 0–0.2)
BASOPHILS NFR BLD AUTO: 0.4 % (ref 0–2)
BILIRUB SERPL-MCNC: 0.3 MG/DL (ref 0.2–1.2)
BUN SERPL-MCNC: 7 MG/DL (ref 7–18.7)
CALCIUM SERPL-MCNC: 8.7 MG/DL (ref 7.8–10.44)
CHLORIDE SERPL-SCNC: 105 MMOL/L (ref 98–107)
CO2 SERPL-SCNC: 23 MMOL/L (ref 22–29)
CREAT CL PREDICTED SERPL C-G-VRATE: 0 ML/MIN (ref 70–130)
EOSINOPHIL # BLD AUTO: 0.2 10X3/UL (ref 0–0.5)
EOSINOPHIL NFR BLD AUTO: 2.2 % (ref 0–6)
GLOBULIN SER CALC-MCNC: 2.6 G/DL (ref 2.4–3.5)
GLUCOSE SERPL-MCNC: 89 MG/DL (ref 70–105)
HGB BLD-MCNC: 11 G/DL (ref 12–15.5)
LIPASE SERPL-CCNC: 63 U/L (ref 8–78)
LYMPHOCYTES NFR BLD AUTO: 26.1 % (ref 18–47)
MCH RBC QN AUTO: 23.7 PG (ref 27–33)
MCV RBC AUTO: 79.3 FL (ref 81.6–98.3)
MICROCYTES BLD QL SMEAR: (no result) (100X)
MONOCYTES # BLD AUTO: 0.9 10X3/UL (ref 0–1.1)
MONOCYTES NFR BLD AUTO: 8.5 % (ref 0–10)
NEUTROPHILS # BLD AUTO: 6.3 10X3/UL (ref 1.5–8.4)
NEUTROPHILS NFR BLD AUTO: 62.5 % (ref 40–75)
PLATELET # BLD AUTO: 359 10X3/UL (ref 150–450)
POTASSIUM SERPL-SCNC: 4.5 MMOL/L (ref 3.5–5.1)
PREGU CONTROL BACKGROUND?: (no result)
PREGU CONTROL BAR APPEAR?: YES
RBC # BLD AUTO: 4.64 10X6/UL (ref 3.9–5.03)
RBC UR QL AUTO: (no result) HPF (ref 0–3)
SODIUM SERPL-SCNC: 137 MMOL/L (ref 136–145)
SP GR UR STRIP: 1.01 (ref 1–1.04)
WBC # BLD AUTO: 10.1 10X3/UL (ref 3.5–10.5)

## 2022-02-16 PROCEDURE — C9113 INJ PANTOPRAZOLE SODIUM, VIA: HCPCS

## 2022-02-16 PROCEDURE — 96374 THER/PROPH/DIAG INJ IV PUSH: CPT

## 2022-02-16 PROCEDURE — 96375 TX/PRO/DX INJ NEW DRUG ADDON: CPT

## 2022-02-16 PROCEDURE — 93005 ELECTROCARDIOGRAM TRACING: CPT

## 2022-02-16 PROCEDURE — 81015 MICROSCOPIC EXAM OF URINE: CPT

## 2022-02-16 PROCEDURE — 84484 ASSAY OF TROPONIN QUANT: CPT

## 2022-02-16 PROCEDURE — 81003 URINALYSIS AUTO W/O SCOPE: CPT

## 2022-02-16 PROCEDURE — 83690 ASSAY OF LIPASE: CPT

## 2022-02-16 PROCEDURE — 80053 COMPREHEN METABOLIC PANEL: CPT

## 2022-02-16 PROCEDURE — 81025 URINE PREGNANCY TEST: CPT

## 2022-02-16 PROCEDURE — 99283 EMERGENCY DEPT VISIT LOW MDM: CPT

## 2022-02-16 PROCEDURE — 85025 COMPLETE CBC W/AUTO DIFF WBC: CPT

## 2022-02-23 ENCOUNTER — HOSPITAL ENCOUNTER (EMERGENCY)
Dept: HOSPITAL 18 - NAV ERS | Age: 32
Discharge: HOME | End: 2022-02-23
Payer: SELF-PAY

## 2022-02-23 DIAGNOSIS — K59.00: Primary | ICD-10-CM

## 2022-02-23 DIAGNOSIS — Z87.891: ICD-10-CM

## 2022-02-23 PROCEDURE — 99283 EMERGENCY DEPT VISIT LOW MDM: CPT

## 2022-07-12 ENCOUNTER — HOSPITAL ENCOUNTER (EMERGENCY)
Dept: HOSPITAL 18 - NAV ERS | Age: 32
Discharge: HOME | End: 2022-07-12
Payer: SELF-PAY

## 2022-07-12 DIAGNOSIS — Z87.891: ICD-10-CM

## 2022-07-12 DIAGNOSIS — B37.3: Primary | ICD-10-CM

## 2022-07-12 PROCEDURE — 99283 EMERGENCY DEPT VISIT LOW MDM: CPT

## 2022-08-09 ENCOUNTER — HOSPITAL ENCOUNTER (EMERGENCY)
Dept: HOSPITAL 18 - NAV ERS | Age: 32
Discharge: HOME | End: 2022-08-09
Payer: SELF-PAY

## 2022-08-09 DIAGNOSIS — Z87.891: ICD-10-CM

## 2022-08-09 DIAGNOSIS — M54.50: Primary | ICD-10-CM

## 2022-08-09 LAB
RBC UR QL AUTO: (no result) HPF (ref 0–3)
SP GR UR STRIP: 1.02 (ref 1–1.03)

## 2022-08-09 PROCEDURE — 81003 URINALYSIS AUTO W/O SCOPE: CPT

## 2022-08-09 PROCEDURE — 81015 MICROSCOPIC EXAM OF URINE: CPT

## 2022-08-09 PROCEDURE — 99283 EMERGENCY DEPT VISIT LOW MDM: CPT

## 2022-08-26 ENCOUNTER — HOSPITAL ENCOUNTER (EMERGENCY)
Dept: HOSPITAL 18 - NAV ERS | Age: 32
Discharge: HOME | End: 2022-08-26
Payer: SELF-PAY

## 2022-08-26 DIAGNOSIS — Z87.891: ICD-10-CM

## 2022-08-26 DIAGNOSIS — K52.9: Primary | ICD-10-CM

## 2022-08-26 LAB
ALBUMIN SERPL BCG-MCNC: 3.9 G/DL (ref 3.5–5)
ALP SERPL-CCNC: 39 U/L (ref 40–110)
ALT SERPL W P-5'-P-CCNC: 15 U/L (ref 8–55)
ANION GAP SERPL CALC-SCNC: 14 MMOL/L (ref 10–20)
AST SERPL-CCNC: 13 U/L (ref 5–34)
BACTERIA UR QL AUTO: (no result) HPF
BASOPHILS # BLD AUTO: 0 THOU/UL (ref 0–0.2)
BASOPHILS NFR BLD AUTO: 0.8 % (ref 0–1)
BILIRUB SERPL-MCNC: 0.3 MG/DL (ref 0.2–1.2)
BUN SERPL-MCNC: 10 MG/DL (ref 7–18.7)
CALCIUM SERPL-MCNC: 9 MG/DL (ref 7.8–10.44)
CHLORIDE SERPL-SCNC: 107 MMOL/L (ref 98–107)
CO2 SERPL-SCNC: 23 MMOL/L (ref 22–29)
CREAT CL PREDICTED SERPL C-G-VRATE: 0 ML/MIN (ref 70–130)
EOSINOPHIL # BLD AUTO: 0.1 THOU/UL (ref 0–0.7)
EOSINOPHIL NFR BLD AUTO: 2 % (ref 0–10)
GLOBULIN SER CALC-MCNC: 2.8 G/DL (ref 2.4–3.5)
GLUCOSE SERPL-MCNC: 104 MG/DL (ref 70–105)
HGB BLD-MCNC: 9.3 G/DL (ref 12–16)
LIPASE SERPL-CCNC: 15 U/L (ref 8–78)
LYMPHOCYTES # BLD AUTO: 1.2 THOU/UL (ref 1.2–3.4)
LYMPHOCYTES NFR BLD AUTO: 23.5 % (ref 21–51)
MCH RBC QN AUTO: 21.7 PG (ref 27–31)
MCV RBC AUTO: 75.1 FL (ref 78–98)
MONOCYTES # BLD AUTO: 0.4 THOU/UL (ref 0.11–0.59)
MONOCYTES NFR BLD AUTO: 8.2 % (ref 0–10)
NEUTROPHILS # BLD AUTO: 3.4 THOU/UL (ref 1.4–6.5)
NEUTROPHILS NFR BLD AUTO: 65.6 % (ref 42–75)
PLATELET # BLD AUTO: 382 THOU/UL (ref 130–400)
POTASSIUM SERPL-SCNC: 4.5 MMOL/L (ref 3.5–5.1)
PROT UR STRIP.AUTO-MCNC: 30 MG/DL
RBC # BLD AUTO: 4.27 MILL/UL (ref 4.2–5.4)
SODIUM SERPL-SCNC: 139 MMOL/L (ref 136–145)
SP GR UR STRIP: 1.02 (ref 1–1.03)
WBC # BLD AUTO: 5.2 THOU/UL (ref 4.8–10.8)

## 2022-08-26 PROCEDURE — 85025 COMPLETE CBC W/AUTO DIFF WBC: CPT

## 2022-08-26 PROCEDURE — C9113 INJ PANTOPRAZOLE SODIUM, VIA: HCPCS

## 2022-08-26 PROCEDURE — 96375 TX/PRO/DX INJ NEW DRUG ADDON: CPT

## 2022-08-26 PROCEDURE — 81015 MICROSCOPIC EXAM OF URINE: CPT

## 2022-08-26 PROCEDURE — 80053 COMPREHEN METABOLIC PANEL: CPT

## 2022-08-26 PROCEDURE — 81003 URINALYSIS AUTO W/O SCOPE: CPT

## 2022-08-26 PROCEDURE — 83690 ASSAY OF LIPASE: CPT

## 2022-08-26 PROCEDURE — 96374 THER/PROPH/DIAG INJ IV PUSH: CPT

## 2022-08-31 ENCOUNTER — HOSPITAL ENCOUNTER (EMERGENCY)
Dept: HOSPITAL 18 - NAV ERS | Age: 32
Discharge: HOME | End: 2022-08-31
Payer: SELF-PAY

## 2022-08-31 DIAGNOSIS — Z87.891: ICD-10-CM

## 2022-08-31 DIAGNOSIS — K04.7: Primary | ICD-10-CM

## 2022-08-31 PROCEDURE — 96372 THER/PROPH/DIAG INJ SC/IM: CPT

## 2022-08-31 PROCEDURE — 99282 EMERGENCY DEPT VISIT SF MDM: CPT

## 2022-11-29 ENCOUNTER — HOSPITAL ENCOUNTER (EMERGENCY)
Dept: HOSPITAL 18 - NAV ERS | Age: 32
Discharge: HOME | End: 2022-11-29
Payer: SELF-PAY

## 2022-11-29 DIAGNOSIS — K02.9: Primary | ICD-10-CM

## 2022-11-29 DIAGNOSIS — F17.210: ICD-10-CM

## 2022-11-29 PROCEDURE — 96372 THER/PROPH/DIAG INJ SC/IM: CPT

## 2022-11-29 PROCEDURE — 99282 EMERGENCY DEPT VISIT SF MDM: CPT

## 2023-12-04 ENCOUNTER — HOSPITAL ENCOUNTER (EMERGENCY)
Dept: HOSPITAL 18 - NAV ERS | Age: 33
Discharge: HOME | End: 2023-12-04
Payer: COMMERCIAL

## 2023-12-04 DIAGNOSIS — Z79.01: ICD-10-CM

## 2023-12-04 DIAGNOSIS — R19.7: Primary | ICD-10-CM

## 2023-12-04 DIAGNOSIS — I10: ICD-10-CM

## 2023-12-04 DIAGNOSIS — Z79.899: ICD-10-CM

## 2023-12-04 PROCEDURE — 99283 EMERGENCY DEPT VISIT LOW MDM: CPT

## 2024-01-16 ENCOUNTER — HOSPITAL ENCOUNTER (EMERGENCY)
Dept: HOSPITAL 18 - NAV ERS | Age: 34
Discharge: HOME | End: 2024-01-16
Payer: COMMERCIAL

## 2024-01-16 DIAGNOSIS — I10: ICD-10-CM

## 2024-01-16 DIAGNOSIS — Z79.01: ICD-10-CM

## 2024-01-16 DIAGNOSIS — Z79.899: ICD-10-CM

## 2024-01-16 DIAGNOSIS — B37.31: Primary | ICD-10-CM

## 2024-01-16 LAB
CAUTI INDICATIONS FOR CULTURE: (no result)
MUCOUS THREADS UR QL AUTO: (no result) LPF
PROT UR STRIP.AUTO-MCNC: 30 MG/DL
SP GR UR STRIP: 1.03 (ref 1–1.04)
WBC UR QL AUTO: (no result) HPF (ref 0–3)

## 2024-01-16 PROCEDURE — 99283 EMERGENCY DEPT VISIT LOW MDM: CPT

## 2024-01-16 PROCEDURE — 81001 URINALYSIS AUTO W/SCOPE: CPT

## 2024-06-10 ENCOUNTER — HOSPITAL ENCOUNTER (INPATIENT)
Dept: HOSPITAL 92 - 2NO | Age: 34
LOS: 1 days | Discharge: HOME | DRG: 811 | End: 2024-06-11
Attending: INTERNAL MEDICINE | Admitting: INTERNAL MEDICINE
Payer: SELF-PAY

## 2024-06-10 ENCOUNTER — HOSPITAL ENCOUNTER (EMERGENCY)
Dept: HOSPITAL 18 - NAV ERS | Age: 34
Discharge: ANOTHER HOSPITAL | End: 2024-06-10
Payer: SELF-PAY

## 2024-06-10 VITALS — BODY MASS INDEX: 36.6 KG/M2

## 2024-06-10 DIAGNOSIS — R79.89: ICD-10-CM

## 2024-06-10 DIAGNOSIS — D64.9: Primary | ICD-10-CM

## 2024-06-10 DIAGNOSIS — Z86.718: ICD-10-CM

## 2024-06-10 DIAGNOSIS — R22.43: ICD-10-CM

## 2024-06-10 DIAGNOSIS — D66: ICD-10-CM

## 2024-06-10 DIAGNOSIS — D62: Primary | ICD-10-CM

## 2024-06-10 DIAGNOSIS — Z86.711: ICD-10-CM

## 2024-06-10 DIAGNOSIS — D68.9: ICD-10-CM

## 2024-06-10 DIAGNOSIS — T45.515A: ICD-10-CM

## 2024-06-10 DIAGNOSIS — Z79.899: ICD-10-CM

## 2024-06-10 DIAGNOSIS — Z91.012: ICD-10-CM

## 2024-06-10 DIAGNOSIS — D68.59: ICD-10-CM

## 2024-06-10 DIAGNOSIS — N92.0: ICD-10-CM

## 2024-06-10 DIAGNOSIS — Z79.01: ICD-10-CM

## 2024-06-10 DIAGNOSIS — I10: ICD-10-CM

## 2024-06-10 LAB
ALBUMIN SERPL BCG-MCNC: 3.5 G/DL (ref 3.5–5)
ALP SERPL-CCNC: 46 U/L (ref 40–110)
ALT SERPL W P-5'-P-CCNC: 18 U/L (ref 8–55)
ANION GAP SERPL CALC-SCNC: 11 MMOL/L (ref 10–20)
ANISOCYTOSIS BLD QL SMEAR: (no result) (100X)
APTT PPP: 33 SEC (ref 22.9–36.1)
AST SERPL-CCNC: 22 U/L (ref 5–34)
BASOPHILS # BLD AUTO: 0 THOU/UL (ref 0–0.2)
BASOPHILS NFR BLD AUTO: 0.9 % (ref 0–1)
BILIRUB SERPL-MCNC: 0.5 MG/DL (ref 0.2–1.2)
BUN SERPL-MCNC: 10 MG/DL (ref 7–18.7)
BURR CELLS BLD QL SMEAR: (no result) (100X)
CALCIUM SERPL-MCNC: 8.5 MG/DL (ref 7.8–10.44)
CHLORIDE SERPL-SCNC: 110 MMOL/L (ref 98–107)
CO2 SERPL-SCNC: 20 MMOL/L (ref 22–29)
COMM CRITICAL RESULTS DOC: (no result)
CREAT CL PREDICTED SERPL C-G-VRATE: 0 ML/MIN (ref 70–130)
D DIMER PPP FEU-MCNC: 0.59 MCG/ML (ref 0.27–0.43)
EOSINOPHIL # BLD AUTO: 0.2 THOU/UL (ref 0–0.7)
EOSINOPHIL NFR BLD AUTO: 3.3 % (ref 0–10)
GIANT PLATELETS BLD QL SMEAR: SLIGHT HPF (ref 0–5)
GLOBULIN SER CALC-MCNC: 2.9 G/DL (ref 2.4–3.5)
GLUCOSE SERPL-MCNC: 96 MG/DL (ref 70–105)
HCT VFR BLD CALC: 20.8 % (ref 36–47)
HCT VFR BLD CALC: 23.9 % (ref 36–47)
HGB BLD-MCNC: 5.3 G/DL (ref 12–16)
HGB BLD-MCNC: 6.3 G/DL (ref 12–16)
INR PPP: 1.4
IRON SERPL-MCNC: 16 UG/DL (ref 50–170)
LYMPHOCYTES # BLD AUTO: 1.4 THOU/UL (ref 1.2–3.4)
LYMPHOCYTES NFR BLD AUTO: 28.5 % (ref 21–51)
MCH RBC QN AUTO: 14.3 PG (ref 27–31)
MCV RBC AUTO: 55.8 FL (ref 78–98)
MICROCYTES BLD QL SMEAR: (no result) (100X)
MONOCYTES # BLD AUTO: 0.6 THOU/UL (ref 0.11–0.59)
MONOCYTES NFR BLD AUTO: 11.8 % (ref 0–10)
NEUTROPHILS # BLD AUTO: 2.7 THOU/UL (ref 1.4–6.5)
NEUTROPHILS NFR BLD AUTO: 55.4 % (ref 42–75)
PLATELET # BLD AUTO: 223 10X3/UL (ref 130–400)
PLATELET # BLD AUTO: 244 10X3/UL (ref 130–400)
PLATELET BLD QL SMEAR: (no result)
POIKILOCYTOSIS BLD QL SMEAR: (no result) (100X)
POLYCHROMASIA BLD QL SMEAR: (no result) (100X)
POTASSIUM SERPL-SCNC: 4.1 MMOL/L (ref 3.5–5.1)
PREGS CONTROL BAR APPEAR?: YES
PROTHROMBIN TIME: 17.6 SEC (ref 12–14.7)
RBC # BLD AUTO: 3.72 MILL/UL (ref 4.2–5.4)
SODIUM SERPL-SCNC: 137 MMOL/L (ref 136–145)
TARGETS BLD QL SMEAR: (no result) (100X)
TROPONIN I SERPL DL<=0.01 NG/ML-MCNC: (no result) NG/ML (ref ?–0.03)
UIBC SERPL-MCNC: 479 MCG/DL (ref 265–497)
WBC # BLD AUTO: 5 10X3/UL (ref 4.8–10.8)

## 2024-06-10 PROCEDURE — 86901 BLOOD TYPING SEROLOGIC RH(D): CPT

## 2024-06-10 PROCEDURE — 85025 COMPLETE CBC W/AUTO DIFF WBC: CPT

## 2024-06-10 PROCEDURE — 80053 COMPREHEN METABOLIC PANEL: CPT

## 2024-06-10 PROCEDURE — 85046 RETICYTE/HGB CONCENTRATE: CPT

## 2024-06-10 PROCEDURE — 85379 FIBRIN DEGRADATION QUANT: CPT

## 2024-06-10 PROCEDURE — 82274 ASSAY TEST FOR BLOOD FECAL: CPT

## 2024-06-10 PROCEDURE — 71045 X-RAY EXAM CHEST 1 VIEW: CPT

## 2024-06-10 PROCEDURE — 86900 BLOOD TYPING SEROLOGIC ABO: CPT

## 2024-06-10 PROCEDURE — 85730 THROMBOPLASTIN TIME PARTIAL: CPT

## 2024-06-10 PROCEDURE — 36430 TRANSFUSION BLD/BLD COMPNT: CPT

## 2024-06-10 PROCEDURE — 81241 F5 GENE: CPT

## 2024-06-10 PROCEDURE — 36415 COLL VENOUS BLD VENIPUNCTURE: CPT

## 2024-06-10 PROCEDURE — 83550 IRON BINDING TEST: CPT

## 2024-06-10 PROCEDURE — 94760 N-INVAS EAR/PLS OXIMETRY 1: CPT

## 2024-06-10 PROCEDURE — P9016 RBC LEUKOCYTES REDUCED: HCPCS

## 2024-06-10 PROCEDURE — 93970 EXTREMITY STUDY: CPT

## 2024-06-10 PROCEDURE — 83880 ASSAY OF NATRIURETIC PEPTIDE: CPT

## 2024-06-10 PROCEDURE — 82728 ASSAY OF FERRITIN: CPT

## 2024-06-10 PROCEDURE — 84484 ASSAY OF TROPONIN QUANT: CPT

## 2024-06-10 PROCEDURE — 83540 ASSAY OF IRON: CPT

## 2024-06-10 PROCEDURE — 80048 BASIC METABOLIC PNL TOTAL CA: CPT

## 2024-06-10 PROCEDURE — 86850 RBC ANTIBODY SCREEN: CPT

## 2024-06-10 PROCEDURE — 84703 CHORIONIC GONADOTROPIN ASSAY: CPT

## 2024-06-10 PROCEDURE — 85610 PROTHROMBIN TIME: CPT

## 2024-06-10 PROCEDURE — 71046 X-RAY EXAM CHEST 2 VIEWS: CPT

## 2024-06-10 PROCEDURE — 30233N1 TRANSFUSION OF NONAUTOLOGOUS RED BLOOD CELLS INTO PERIPHERAL VEIN, PERCUTANEOUS APPROACH: ICD-10-PCS | Performed by: INTERNAL MEDICINE

## 2024-06-10 RX ADMIN — THERA TABS SCH TAB: TAB at 20:57

## 2024-06-10 RX ADMIN — SODIUM FERRIC GLUCONATE COMPLEX IN SUCROSE SCH MLS: 12.5 INJECTION INTRAVENOUS at 20:58

## 2024-06-10 RX ADMIN — CYANOCOBALAMIN TAB 1000 MCG SCH MCG: 1000 TAB at 20:57

## 2024-06-11 VITALS — SYSTOLIC BLOOD PRESSURE: 161 MMHG | DIASTOLIC BLOOD PRESSURE: 86 MMHG

## 2024-06-11 VITALS — TEMPERATURE: 98.2 F

## 2024-06-11 LAB
ANION GAP SERPL CALC-SCNC: 8 MMOL/L (ref 10–20)
BASOPHILS # BLD AUTO: 0.04 10X3/UL (ref 0–0.2)
BASOPHILS NFR BLD AUTO: 0.5 % (ref 0–1)
BUN SERPL-MCNC: 12 MG/DL (ref 7–18.7)
CALCIUM SERPL-MCNC: 8.6 MG/DL (ref 7.8–10.44)
CHLORIDE SERPL-SCNC: 110 MMOL/L (ref 98–107)
CO2 SERPL-SCNC: 21 MMOL/L (ref 22–29)
CREAT CL PREDICTED SERPL C-G-VRATE: 157 ML/MIN (ref 70–130)
EOSINOPHIL # BLD AUTO: 0.2 10X3/UL (ref 0–0.7)
EOSINOPHIL NFR BLD AUTO: 2.4 % (ref 0–10)
GLUCOSE SERPL-MCNC: 101 MG/DL (ref 70–105)
HCT VFR BLD CALC: 27.4 % (ref 36–47)
HGB BLD-MCNC: 7.5 G/DL (ref 12–16)
LYMPHOCYTES NFR BLD AUTO: 31.7 % (ref 21–51)
MCH RBC QN AUTO: 18.2 PG (ref 27–31)
MCV RBC AUTO: 66.5 FL (ref 78–98)
MONOCYTES # BLD AUTO: 0.6 10X3/UL (ref 0.11–0.59)
MONOCYTES NFR BLD AUTO: 7.7 % (ref 0–10)
NEUTROPHILS # BLD AUTO: 4.5 10X3/UL (ref 1.4–6.5)
NEUTROPHILS NFR BLD AUTO: 56.8 % (ref 42–75)
PLATELET # BLD AUTO: 249 10X3/UL (ref 130–400)
POTASSIUM SERPL-SCNC: 3.9 MMOL/L (ref 3.5–5.1)
RBC # BLD AUTO: 4.12 MILL/UL (ref 4.2–5.4)
SODIUM SERPL-SCNC: 135 MMOL/L (ref 136–145)
WBC # BLD AUTO: 7.9 10X3/UL (ref 4.8–10.8)